# Patient Record
Sex: FEMALE
[De-identification: names, ages, dates, MRNs, and addresses within clinical notes are randomized per-mention and may not be internally consistent; named-entity substitution may affect disease eponyms.]

---

## 2018-04-29 ENCOUNTER — HOSPITAL ENCOUNTER (EMERGENCY)
Dept: HOSPITAL 31 - C.ER | Age: 43
Discharge: HOME | End: 2018-04-29
Payer: COMMERCIAL

## 2018-04-29 VITALS — BODY MASS INDEX: 24 KG/M2

## 2018-04-29 VITALS — SYSTOLIC BLOOD PRESSURE: 120 MMHG | DIASTOLIC BLOOD PRESSURE: 77 MMHG | HEART RATE: 65 BPM

## 2018-04-29 VITALS — TEMPERATURE: 98 F

## 2018-04-29 VITALS — RESPIRATION RATE: 18 BRPM

## 2018-04-29 DIAGNOSIS — R10.11: Primary | ICD-10-CM

## 2018-04-29 LAB
ALBUMIN SERPL-MCNC: 4 G/DL (ref 3.5–5)
ALBUMIN/GLOB SERPL: 1 {RATIO} (ref 1–2.1)
ALT SERPL-CCNC: 31 U/L (ref 9–52)
AST SERPL-CCNC: 28 U/L (ref 14–36)
BASOPHILS # BLD AUTO: 0 K/UL (ref 0–0.2)
BASOPHILS NFR BLD: 0.7 % (ref 0–2)
BILIRUB UR-MCNC: NEGATIVE MG/DL
BUN SERPL-MCNC: 15 MG/DL (ref 7–17)
CALCIUM SERPL-MCNC: 8.6 MG/DL (ref 8.6–10.4)
EOSINOPHIL # BLD AUTO: 0.2 K/UL (ref 0–0.7)
EOSINOPHIL NFR BLD: 3.3 % (ref 0–4)
ERYTHROCYTE [DISTWIDTH] IN BLOOD BY AUTOMATED COUNT: 20.3 % (ref 11.5–14.5)
GFR NON-AFRICAN AMERICAN: > 60
GLUCOSE UR STRIP-MCNC: NORMAL MG/DL
HGB BLD-MCNC: 10.5 G/DL (ref 11–16)
LEUKOCYTE ESTERASE UR-ACNC: (no result) LEU/UL
LIPASE: 124 U/L (ref 23–300)
LYMPHOCYTES # BLD AUTO: 1.2 K/UL (ref 1–4.3)
LYMPHOCYTES NFR BLD AUTO: 18.5 % (ref 20–40)
MCH RBC QN AUTO: 23.6 PG (ref 27–31)
MCHC RBC AUTO-ENTMCNC: 33.4 G/DL (ref 33–37)
MCV RBC AUTO: 70.7 FL (ref 81–99)
MONOCYTES # BLD: 0.6 K/UL (ref 0–0.8)
MONOCYTES NFR BLD: 9.1 % (ref 0–10)
NEUTROPHILS # BLD: 4.6 K/UL (ref 1.8–7)
NEUTROPHILS NFR BLD AUTO: 68.4 % (ref 50–75)
NRBC BLD AUTO-RTO: 0 % (ref 0–2)
PH UR STRIP: 5 [PH] (ref 5–8)
PLATELET # BLD: 204 K/UL (ref 130–400)
PMV BLD AUTO: 9.7 FL (ref 7.2–11.7)
PROT UR STRIP-MCNC: NEGATIVE MG/DL
RBC # BLD AUTO: 4.45 MIL/UL (ref 3.8–5.2)
RBC # UR STRIP: (no result) /UL
SP GR UR STRIP: 1.02 (ref 1–1.03)
UROBILINOGEN UR-MCNC: NORMAL MG/DL (ref 0.2–1)
WBC # BLD AUTO: 6.7 K/UL (ref 4.8–10.8)

## 2018-04-29 PROCEDURE — 76705 ECHO EXAM OF ABDOMEN: CPT

## 2018-04-29 PROCEDURE — 99285 EMERGENCY DEPT VISIT HI MDM: CPT

## 2018-04-29 PROCEDURE — 85025 COMPLETE CBC W/AUTO DIFF WBC: CPT

## 2018-04-29 PROCEDURE — 96375 TX/PRO/DX INJ NEW DRUG ADDON: CPT

## 2018-04-29 PROCEDURE — 83690 ASSAY OF LIPASE: CPT

## 2018-04-29 PROCEDURE — 81001 URINALYSIS AUTO W/SCOPE: CPT

## 2018-04-29 PROCEDURE — 74176 CT ABD & PELVIS W/O CONTRAST: CPT

## 2018-04-29 PROCEDURE — 96374 THER/PROPH/DIAG INJ IV PUSH: CPT

## 2018-04-29 PROCEDURE — 80053 COMPREHEN METABOLIC PANEL: CPT

## 2018-04-29 NOTE — US
HISTORY:

Right upper quadrant pain



COMPARISON:

No prior study available for comparison



TECHNIQUE:

Sonographic evaluation of the right upper quadrant of the abdomen.



FINDINGS:



LIVER:

Measures 12.65 cm in length. Normal echogenicity of the liver 

parenchyma.  No mass. No intrahepatic bile duct dilatation. 



GALLBLADDER:

Unremarkable. No gallstones. 



COMMON BILE DUCT:

Measures 3.1 mm.  No stones. No dilatation.



PANCREAS:

Unremarkable as visualized. No mass. No ductal dilatation.



RIGHT KIDNEY:

Measures 10.2 x 4.4 x 4.4cm in length. Normal echogenicity. No 

calculus, mass, or hydronephrosis.



AORTA:

No aneurysmal dilatation.



IVC:

Unremarkable.



OTHER FINDINGS:

None .



IMPRESSION:

No acute findings.

## 2018-04-29 NOTE — CT
PROCEDURE:  CT scan abdomen and pelvis dated 04/29/2018 



HISTORY:

Abdominal pain. 



COMPARISON:

No prior study available comparison. 



TECHNIQUE:

Contiguous axial images of the abdomen and pelvis performed without 

oral or intravenous contrast material.  Additional 2 dimensional 

sagittal and coronal reformats generated. 



Radiation dose:



Total exam DLP =  



This CT exam was performed using one or more of the following dose 

reduction techniques: Automated exposure control, adjustment of the 

mA and/or kV according to patient size, and/or use of iterative 

reconstruction technique.



FINDINGS:



LOWER THORAX:

Minor scarring changes seen in the lingular and middle lobe regions.  

Lung fields are otherwise clear.  No evidence of effusion or basilar 

pneumothorax.  



There is a tiny hiatal hernia. 



Heart size within range of normal. No significant pericardial 

effusion.



LIVER:

Liver exhibits normal size measuring approximately 15.2 cm and CC 

dimension.



GALLBLADDER AND BILE DUCTS:

Gallbladder appears incompletely distended.  No obvious intraluminal 

gallbladder calculi. 



PANCREAS:

The unenhanced pancreas appears unremarkable without masses 

collections or calcifications so far as can be seen. Unremarkable. No 

mass. No ductal dilatation.



SPLEEN:

Spleen exhibits normal size and attenuation pattern without mass 

collection or calcification. 



ADRENALS:

The 19 mm elliptical shaped low-attenuation adrenal lesion consistent 

with adrenal adenoma noted. Right adrenal unremarkable. 



KIDNEYS AND URETERS:

Kidneys demonstrate relatively symmetric size. No evidence of 

nephrolithiasis or hydronephrosis. 



BLADDER:

Urinary bladder is physiologically distended.  No evidence of 

intraluminal urinary bladder calculi.



REPRODUCTIVE:

Uterus is of bulky in appearance.  Rule out uterine fibroids. In situ 

Copper-T IUD. 



APPENDIX:

Normal-appearing appendix best seen on coronal image number [id number]. 



BOWEL:

Evaluation of the bowel is limited due to the lack of oral contrast 

material. Stomach is partially distended with food debris liquid and 

air.  Visualized loops of small bowel exhibit normal contour and 

caliber. No evidence of acute mechanical small bowel obstruction. 



Stool and air seen throughout the large bowel. No definitive evidence 

of abnormal mural wall thickening. 



PERITONEUM:

Unremarkable. No fluid collection. No free air. Small fat containing 

umbilical hernia. 



LYMPH NODES:

Unremarkable. No enlarged lymph nodes. 



VASCULATURE:

Unremarkable. No aortic aneurysm. 



BONES:

Minimal degenerative spondylosis lower thoracic spine.



OTHER FINDINGS:

None. 



IMPRESSION:

The small adrenal adenoma.



Bulky appearing uterus; rule out uterine fibroids.

## 2018-04-29 NOTE — C.PDOC
History Of Present Illness


42-year-old female, presents to the emergency department with complaints of 

right upper quadrant abdominal pain x3 days associated with non-bloody/watery 

diarrhea, and dysuria. Patient denies nausea/vomiting, fevers, chills, 

shortness of breath or any other associated symptoms. No other complaints at 

this time,


Time Seen by Provider: 04/29/18 13:45


Chief Complaint (Nursing): Abdominal Pain


History Per: Patient


History/Exam Limitations: no limitations





Past Medical History


Reviewed: Historical Data, Nursing Documentation, Vital Signs


Vital Signs: 


 Last Vital Signs











Temp  98 F   04/29/18 13:34


 


Pulse  65   04/29/18 18:08


 


Resp  18   04/29/18 18:08


 


BP  120/77   04/29/18 18:08


 


Pulse Ox  97   04/29/18 18:08











Family History: States: No Known Family Hx





- Social History


Hx Alcohol Use: No


Hx Substance Use: No





- Immunization History


Hx Tetanus Toxoid Vaccination: No


Hx Influenza Vaccination: No


Hx Pneumococcal Vaccination: No





Review Of Systems


Constitutional: Negative for: Fever


Cardiovascular: Negative for: Chest Pain


Respiratory: Negative for: Shortness of Breath


Gastrointestinal: Positive for: Abdominal Pain, Diarrhea.  Negative for: Nausea

, Vomiting


Musculoskeletal: Negative for: Back Pain


Neurological: Negative for: Weakness, Numbness, Headache, Dizziness





Physical Exam





- Physical Exam


Appears: Non-toxic, No Acute Distress


Skin: Normal Color, Warm, Dry, No Rash


Head: Normacephalic


Eye(s): bilateral: PERRL


Nose: Normal


Oral Mucosa: Moist


Lips: Normal Appearing


Neck: Normal ROM


Chest: Symmetrical


Cardiovascular: Rhythm Regular, No Murmur


Respiratory: Normal Breath Sounds, No Accessory Muscle Use


Gastrointestinal/Abdominal: Soft, Tenderness (RUQ), No Guarding, No Rebound


Extremity: Normal ROM, No Deformity, No Swelling


Neurological/Psych: Oriented x3, Normal Speech





ED Course And Treatment





- Laboratory Results


Result Diagrams: 


 04/29/18 14:53





 04/29/18 14:53


O2 Sat by Pulse Oximetry: 100 (RA)


Pulse Ox Interpretation: Normal





Medical Decision Making


Medical Decision Making: 


Impression: abdominal pain and diarrhea





Plan:


* Bloodwork


* Pepcid, Protonix, Toradol, Zofran


* UA


* US


* Gallbladder


* Reassess and Disposition


* 


* patient states improvement. Will discharge home to follow up with pmd in 2 

days





Disposition


Counseled Patient/Family Regarding: Studies Performed, Diagnosis, Need For 

Followup, Rx Given





- Disposition


Referrals: 


[facility] [Outside]


Disposition: HOME/ ROUTINE


Disposition Time: 17:54


Condition: STABLE


Additional Instructions: 


follow up with clinic in 2 days


call to make an appointment


take medications as prescribed


return to ER if symptoms worsens or progress 


Prescriptions: 


Acetaminophen/Codeine [Tylenol/Codeine 300 MG/30 MG] 1 tab PO Q6H PRN #12 tab


 PRN Reason: Pain, Severe (8-10)


Naproxen [Naprosyn] 500 mg PO BID PRN #16 tab


 PRN Reason: Pain, Moderate (4-7)


Instructions:  Acute Abdomen (Belly Pain), Adult (DC)


Forms:  Gen Discharge Inst Urdu, [employer] (Urdu), Work Excuse


Print Language: Occitan





- Clinical Impression


Clinical Impression: 


 Abdominal pain








- Scribe Statement


The provider has reviewed the documentation as recorded by the Scribe (Eran Newman)


All medical record entries made by the Scribe were at my direction and 

personally dictated by me. I have reviewed the chart and agree that the record 

accurately reflects my personal performance of the history, physical exam, 

medical decision making, and the department course for this patient. I have 

also personally directed, reviewed, and agree with the discharge instructions 

and disposition.

## 2018-04-30 VITALS — OXYGEN SATURATION: 100 %
